# Patient Record
Sex: MALE | Race: WHITE | Employment: UNEMPLOYED | URBAN - METROPOLITAN AREA
[De-identification: names, ages, dates, MRNs, and addresses within clinical notes are randomized per-mention and may not be internally consistent; named-entity substitution may affect disease eponyms.]

---

## 2019-09-17 ENCOUNTER — MEDICAL CORRESPONDENCE (OUTPATIENT)
Dept: HEALTH INFORMATION MANAGEMENT | Facility: CLINIC | Age: 19
End: 2019-09-17

## 2019-09-20 ENCOUNTER — ALLIED HEALTH/NURSE VISIT (OUTPATIENT)
Dept: PEDIATRICS | Facility: CLINIC | Age: 19
End: 2019-09-20
Payer: COMMERCIAL

## 2019-09-20 DIAGNOSIS — Z82.49: Primary | ICD-10-CM

## 2019-09-20 PROCEDURE — 93005 ELECTROCARDIOGRAM TRACING: CPT | Mod: ZF

## 2019-09-20 PROCEDURE — 40000269 ZZH STATISTIC NO CHARGE FACILITY FEE: Mod: ZF

## 2019-09-20 NOTE — NURSING NOTE
Chief Complaint   Patient presents with     Allied Health Visit     ekg     There were no vitals filed for this visit.  Keke Monet LPN  September 20, 2019

## 2019-09-20 NOTE — PATIENT INSTRUCTIONS
PEDIATRIC SPECIALTY CLINIC  [facility]  [address]  [address] [id number] [phone]      Cardiology Clinic  [phone]  RN Care Coordinator, Jeanie Andrew (Bre) or Aileen Mejias  [phone]  Pediatric Call Center/Scheduling  [phone]    After Hours and Emergency Contact Number  [phone]  * Ask for the pediatric cardiologist on call         Prescription Renewals  The pharmacy must fax requests to [phone]  * Please allow 3-4 days for prescriptions to be authorized

## 2019-09-21 LAB — INTERPRETATION ECG - MUSE: NORMAL

## 2021-10-03 ENCOUNTER — HEALTH MAINTENANCE LETTER (OUTPATIENT)
Age: 21
End: 2021-10-03

## 2022-09-04 ENCOUNTER — HEALTH MAINTENANCE LETTER (OUTPATIENT)
Age: 22
End: 2022-09-04

## 2023-01-15 ENCOUNTER — HEALTH MAINTENANCE LETTER (OUTPATIENT)
Age: 23
End: 2023-01-15

## 2024-02-18 ENCOUNTER — HEALTH MAINTENANCE LETTER (OUTPATIENT)
Age: 24
End: 2024-02-18